# Patient Record
Sex: MALE | Race: WHITE | NOT HISPANIC OR LATINO | Employment: FULL TIME | URBAN - METROPOLITAN AREA
[De-identification: names, ages, dates, MRNs, and addresses within clinical notes are randomized per-mention and may not be internally consistent; named-entity substitution may affect disease eponyms.]

---

## 2017-06-04 ENCOUNTER — HOSPITAL ENCOUNTER (EMERGENCY)
Facility: HOSPITAL | Age: 27
Discharge: HOME | End: 2017-06-04
Attending: EMERGENCY MEDICINE | Admitting: EMERGENCY MEDICINE

## 2017-06-04 VITALS
BODY MASS INDEX: 39.28 KG/M2 | SYSTOLIC BLOOD PRESSURE: 128 MMHG | HEART RATE: 84 BPM | OXYGEN SATURATION: 98 % | HEIGHT: 72 IN | TEMPERATURE: 97.9 F | DIASTOLIC BLOOD PRESSURE: 69 MMHG | RESPIRATION RATE: 16 BRPM | WEIGHT: 290 LBS

## 2017-06-04 DIAGNOSIS — T26.12XA: Primary | ICD-10-CM

## 2017-06-04 PROCEDURE — 99282 EMERGENCY DEPT VISIT SF MDM: CPT

## 2017-06-04 RX ORDER — DICLOFENAC SODIUM 75 MG/1
75 TABLET, DELAYED RELEASE ORAL 2 TIMES DAILY
Qty: 14 TABLET | Refills: 0 | Status: SHIPPED | OUTPATIENT
Start: 2017-06-04 | End: 2018-10-07

## 2017-06-04 RX ORDER — TRAMADOL HYDROCHLORIDE 50 MG/1
50 TABLET ORAL EVERY 6 HOURS PRN
Qty: 15 TABLET | Refills: 0 | Status: SHIPPED | OUTPATIENT
Start: 2017-06-04 | End: 2018-10-07

## 2017-06-04 RX ORDER — ERYTHROMYCIN 5 MG/G
OINTMENT OPHTHALMIC
Qty: 3.5 G | Refills: 0 | Status: SHIPPED | OUTPATIENT
Start: 2017-06-04 | End: 2017-06-11

## 2017-06-05 NOTE — ED PROVIDER NOTES
Subjective   HPI Comments: Coleman Nolasco is a 27 y.o.male who presents to the ED with c/o eye pain.  While the pt was cooking at work today, cooking oil splashed into his left eye.  He immediately washed the eye with water, but he developed progressively worsening pain and intermittent blurriness in the eye.  Due to worsening of sx the pt presents to the ED where his eye appears red and he states that he has a HA, secondary to pain, but denies any other severe burns or acute sx.    Patient is a 27 y.o. male presenting with eye problem.   History provided by:  Patient  Eye Problem   Location:  Left eye  Onset quality:  Sudden  Duration: incident occurred today.  Timing:  Constant  Progression:  Worsening  Chronicity:  New  Context: burn    Relieved by:  Nothing  Worsened by:  Nothing  Ineffective treatments:  Flushing  Associated symptoms: blurred vision, headaches and redness    Associated symptoms: no photophobia        Review of Systems   Eyes: Positive for blurred vision, pain, redness and visual disturbance. Negative for photophobia.   Neurological: Positive for headaches.   All other systems reviewed and are negative.      History reviewed. No pertinent past medical history.    No Known Allergies    History reviewed. No pertinent surgical history.    History reviewed. No pertinent family history.    Social History     Social History   • Marital status: Single     Spouse name: N/A   • Number of children: N/A   • Years of education: N/A     Social History Main Topics   • Smoking status: Never Smoker   • Smokeless tobacco: None   • Alcohol use Yes      Comment: occasional   • Drug use: Yes     Special: Marijuana   • Sexual activity: Not Asked     Other Topics Concern   • None     Social History Narrative   • None         Objective   Physical Exam   Constitutional: He is oriented to person, place, and time. He appears well-developed and well-nourished.   HENT:   Head: Normocephalic and atraumatic.   Eyes:  Conjunctivae and EOM are normal. Pupils are equal, round, and reactive to light. Right eye exhibits no discharge. Left eye exhibits no discharge. No scleral icterus.   Cornea clear.  Lids everted.  Conjunctiva pink, normal.  No swelling.  No periorbital swelling.   Neck: Neck supple.   Cardiovascular: Normal rate.    Pulmonary/Chest: Effort normal.   Musculoskeletal: Normal range of motion.   Neurological: He is alert and oriented to person, place, and time.   Skin: Skin is warm and dry.   Psychiatric: He has a normal mood and affect. His behavior is normal. Thought content normal.   Nursing note and vitals reviewed.      Procedures         ED Course  ED Course     Minor thermal burn to R cornea.  Pt counseled on findings, plan and followup.                MDM    Final diagnoses:   Thermal burn of cornea, left, initial encounter       Documentation assistance provided by nieves Collier.  Information recorded by the nieves was done at my direction and has been verified and validated by me.     Shaw Collier  06/04/17 2148       Magdy Gore MD  06/05/17 0008

## 2018-10-06 ENCOUNTER — HOSPITAL ENCOUNTER (EMERGENCY)
Facility: HOSPITAL | Age: 28
Discharge: HOME | End: 2018-10-07
Attending: EMERGENCY MEDICINE | Admitting: EMERGENCY MEDICINE

## 2018-10-06 DIAGNOSIS — T25.222A BURN, FOOT, SECOND DEGREE, LEFT, INITIAL ENCOUNTER: ICD-10-CM
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
## 2018-10-06 DIAGNOSIS — T24.209A SUPERFICIAL PARTIAL THICKNESS BURN OF LOWER EXTREMITY: Primary | ICD-10-CM

## 2018-10-06 PROCEDURE — 99283 EMERGENCY DEPT VISIT LOW MDM: CPT

## 2018-10-07 VITALS
HEIGHT: 72 IN | SYSTOLIC BLOOD PRESSURE: 137 MMHG | BODY MASS INDEX: 37.25 KG/M2 | HEART RATE: 82 BPM | TEMPERATURE: 98.2 F | OXYGEN SATURATION: 98 % | WEIGHT: 275 LBS | RESPIRATION RATE: 18 BRPM | DIASTOLIC BLOOD PRESSURE: 87 MMHG

## 2018-10-07 RX ORDER — NAPROXEN 500 MG/1
500 TABLET ORAL ONCE
Status: COMPLETED | OUTPATIENT
Start: 2018-10-07 | End: 2018-10-07

## 2018-10-07 RX ORDER — NAPROXEN 500 MG/1
500 TABLET ORAL 2 TIMES DAILY WITH MEALS
Qty: 14 TABLET | Refills: 0 | Status: SHIPPED | OUTPATIENT
Start: 2018-10-07 | End: 2019-04-18

## 2018-10-07 RX ADMIN — SILVER SULFADIAZINE 1 APPLICATION: 10 CREAM TOPICAL at 00:26

## 2018-10-07 RX ADMIN — NAPROXEN 500 MG: 500 TABLET ORAL at 00:29

## 2018-10-07 NOTE — ED PROVIDER NOTES
Subjective   Mr. Coleman Nolasco is a 28 y.o. male who presents to the ED with c/o a left foot burn. He reports that around 2130 today he was at work when he splashed hot water on his foot. He has since developed blisters to his left foot, which have opened. He states that he attempted to apply burn cream right after the event but it caused even more pain so he stopped. No other acute complaints at this time. Tetanus UTD per patient.         History provided by:  Patient  Lower Extremity Issue   Location:  Foot  Time since incident:  3 hours  Injury: yes    Mechanism of injury comment:  Burn  Foot location:  L foot  Pain details:     Severity:  Moderate    Onset quality:  Sudden    Duration:  3 hours    Timing:  Constant  Chronicity:  New  Dislocation: no    Foreign body present:  No foreign bodies      Review of Systems   Skin:        Burn blister to left foot       History reviewed. No pertinent past medical history.    No Known Allergies    History reviewed. No pertinent surgical history.    History reviewed. No pertinent family history.    Social History     Social History   • Marital status: Single     Social History Main Topics   • Smoking status: Never Smoker   • Alcohol use Yes      Comment: occasional   • Drug use: Yes     Types: Marijuana      Comment: occassional     Other Topics Concern   • Not on file         Objective   Physical Exam   Constitutional: He is oriented to person, place, and time. He appears well-developed and well-nourished. No distress.   Cardiovascular: Normal rate, regular rhythm, normal heart sounds and intact distal pulses.    No murmur heard.  Pulmonary/Chest: Effort normal and breath sounds normal. No respiratory distress.   Musculoskeletal: Normal range of motion.        Neurological: He is alert and oriented to person, place, and time.   Skin: Skin is warm and dry. He is not diaphoretic.   Patient has a 5cm partial thickness 2nd degree burn to the lateral aspect of his left  "foot.    Psychiatric: He has a normal mood and affect. His behavior is normal.   Nursing note and vitals reviewed.      Procedures         ED Course       No results found for this or any previous visit (from the past 24 hour(s)).  Note: In addition to lab results from this visit, the labs listed above may include labs taken at another facility or during a different encounter within the last 24 hours. Please correlate lab times with ED admission and discharge times for further clarification of the services performed during this visit.    No orders to display     Vitals:    10/06/18 2213 10/07/18 0043   BP: 135/82 137/87   BP Location: Right arm    Patient Position: Sitting    Pulse: 104 82   Resp: 22 18   Temp: 98.2 °F (36.8 °C)    TempSrc: Oral    SpO2: 98% 98%   Weight: 125 kg (275 lb)    Height: 182.9 cm (72\")      Medications   naproxen (NAPROSYN) tablet 500 mg (500 mg Oral Given 10/7/18 0029)   silver sulfadiazine (SILVADENE, SSD) 1 % cream 1 application (1 application Topical Given 10/7/18 0026)     ECG/EMG Results (last 24 hours)     ** No results found for the last 24 hours. **                      MDM  Number of Diagnoses or Management Options  Burn, foot, second degree, left, initial encounter:   Superficial partial thickness burn of lower extremity:   Work related injury:       Final diagnoses:   Superficial partial thickness burn of lower extremity   Burn, foot, second degree, left, initial encounter   Work related injury       Documentation assistance provided by nieves Renee.  Information recorded by the nieves was done at my direction and has been verified and validated by me.     Carlota Renee  10/07/18 0007       Juan Villanueva MD  10/07/18 0120    "

## 2019-04-18 ENCOUNTER — APPOINTMENT (OUTPATIENT)
Dept: GENERAL RADIOLOGY | Facility: HOSPITAL | Age: 29
End: 2019-04-18

## 2019-04-18 ENCOUNTER — HOSPITAL ENCOUNTER (EMERGENCY)
Facility: HOSPITAL | Age: 29
Discharge: HOME | End: 2019-04-18
Attending: EMERGENCY MEDICINE | Admitting: EMERGENCY MEDICINE

## 2019-04-18 VITALS
SYSTOLIC BLOOD PRESSURE: 138 MMHG | WEIGHT: 280 LBS | HEART RATE: 79 BPM | DIASTOLIC BLOOD PRESSURE: 75 MMHG | BODY MASS INDEX: 37.93 KG/M2 | RESPIRATION RATE: 18 BRPM | HEIGHT: 72 IN | OXYGEN SATURATION: 98 % | TEMPERATURE: 98.5 F

## 2019-04-18 DIAGNOSIS — S62.339A CLOSED BOXER'S FRACTURE, INITIAL ENCOUNTER: Primary | ICD-10-CM

## 2019-04-18 PROCEDURE — 73130 X-RAY EXAM OF HAND: CPT

## 2019-04-18 PROCEDURE — 99283 EMERGENCY DEPT VISIT LOW MDM: CPT

## 2020-01-17 ENCOUNTER — HOSPITAL ENCOUNTER (EMERGENCY)
Facility: HOSPITAL | Age: 30
Discharge: HOME | End: 2020-01-17
Attending: EMERGENCY MEDICINE | Admitting: EMERGENCY MEDICINE

## 2020-01-17 VITALS
WEIGHT: 280 LBS | HEIGHT: 72 IN | DIASTOLIC BLOOD PRESSURE: 100 MMHG | BODY MASS INDEX: 37.93 KG/M2 | TEMPERATURE: 98.6 F | RESPIRATION RATE: 18 BRPM | HEART RATE: 60 BPM | OXYGEN SATURATION: 98 % | SYSTOLIC BLOOD PRESSURE: 161 MMHG

## 2020-01-17 DIAGNOSIS — S61.209A AVULSION OF SKIN OF FINGER, INITIAL ENCOUNTER: Primary | ICD-10-CM

## 2020-01-17 PROCEDURE — 99283 EMERGENCY DEPT VISIT LOW MDM: CPT

## 2020-01-17 NOTE — ED PROVIDER NOTES
Subjective   Pt is a 30 yo male presenting to ED with complaints of left thumb laceration. He was cutting cilantro at work at [employer] with a knife and accidentally sliced tip of left thumb. Flap of skin came off and no nail involved. Bleeding controlled with pressure bandage. No gapping wound. He denies pain with movement. He denies concern for foreign bodies. His Tdap is UTD.       History provided by:  Patient  Laceration   Location:  Finger  Finger laceration location:  L thumb  Length:  1  Depth:  Cutaneous  Quality: avulsion    Bleeding: controlled    Time since incident:  1 hour  Laceration mechanism:  Knife  Foreign body present:  No foreign bodies  Relieved by:  Pressure  Worsened by:  Nothing  Tetanus status:  Up to date  Associated symptoms: no focal weakness, no numbness and no swelling        Review of Systems   Skin: Positive for wound (Left thumb laceration).   Neurological: Negative for focal weakness, weakness and numbness.   All other systems reviewed and are negative.      No past medical history on file.    No Known Allergies    No past surgical history on file.    No family history on file.    Social History     Socioeconomic History   • Marital status: Single     Spouse name: Not on file   • Number of children: Not on file   • Years of education: Not on file   • Highest education level: Not on file   Tobacco Use   • Smoking status: Never Smoker   Substance and Sexual Activity   • Alcohol use: Yes     Comment: occasional   • Drug use: Yes     Types: Marijuana     Comment: occassional   • Sexual activity: Defer           Objective   Physical Exam   Constitutional: He appears well-developed and well-nourished.   HENT:   Head: Atraumatic.   Eyes: Conjunctivae are normal.   Neck: Normal range of motion.   Cardiovascular: Normal rate and intact distal pulses.   Pulmonary/Chest: Effort normal. No respiratory distress.   Musculoskeletal:        Left hand: He exhibits laceration. He exhibits  "normal range of motion, no tenderness, normal capillary refill, no deformity and no swelling. Normal sensation noted. Normal strength noted.        Hands:  Neurological: He is alert.   Skin: Skin is warm.   Psychiatric: He has a normal mood and affect.   Nursing note and vitals reviewed.      Procedures           ED Course      Wound cleaned with saline and hibiclens. Dressed with non stick dressing.     Discussed healing process from inside out. No need for sutures. Discussed hygenic practices to keep wound clean.     No results found for this or any previous visit (from the past 24 hour(s)).  Note: In addition to lab results from this visit, the labs listed above may include labs taken at another facility or during a different encounter within the last 24 hours. Please correlate lab times with ED admission and discharge times for further clarification of the services performed during this visit.    No orders to display     Vitals:    01/17/20 1056 01/17/20 1107   BP: 161/100    Pulse: 60    Resp: 18    Temp: 98.6 °F (37 °C)    SpO2: 98%    Weight:  127 kg (280 lb)   Height:  182.9 cm (72\")     Medications - No data to display  ECG/EMG Results (last 24 hours)     ** No results found for the last 24 hours. **        No orders to display                       Asim Coma Scale Score: 15                          MDM    Final diagnoses:   Avulsion of skin of finger, initial encounter            Maryellen Blood PA  01/17/20 1147    "

## 2021-08-03 ENCOUNTER — OFFICE VISIT (OUTPATIENT)
Dept: INTERNAL MEDICINE | Facility: CLINIC | Age: 31
End: 2021-08-03

## 2021-08-03 ENCOUNTER — LAB (OUTPATIENT)
Dept: LAB | Facility: HOSPITAL | Age: 31
End: 2021-08-03

## 2021-08-03 VITALS
OXYGEN SATURATION: 96 % | HEIGHT: 71 IN | HEART RATE: 92 BPM | BODY MASS INDEX: 44.1 KG/M2 | DIASTOLIC BLOOD PRESSURE: 106 MMHG | WEIGHT: 315 LBS | SYSTOLIC BLOOD PRESSURE: 134 MMHG

## 2021-08-03 DIAGNOSIS — L30.4 INTERTRIGO: ICD-10-CM

## 2021-08-03 DIAGNOSIS — H01.136 EYELID DERMATITIS, ECZEMATOUS, LEFT: ICD-10-CM

## 2021-08-03 DIAGNOSIS — Z11.3 ROUTINE SCREENING FOR STI (SEXUALLY TRANSMITTED INFECTION): ICD-10-CM

## 2021-08-03 DIAGNOSIS — Z76.89 ENCOUNTER TO ESTABLISH CARE: ICD-10-CM

## 2021-08-03 DIAGNOSIS — Z76.89 ENCOUNTER TO ESTABLISH CARE: Primary | ICD-10-CM

## 2021-08-03 LAB
BASOPHILS # BLD AUTO: 0.06 10*3/MM3 (ref 0–0.2)
BASOPHILS NFR BLD AUTO: 0.7 % (ref 0–1.5)
DEPRECATED RDW RBC AUTO: 38.8 FL (ref 37–54)
EOSINOPHIL # BLD AUTO: 0.65 10*3/MM3 (ref 0–0.4)
EOSINOPHIL NFR BLD AUTO: 7.5 % (ref 0.3–6.2)
ERYTHROCYTE [DISTWIDTH] IN BLOOD BY AUTOMATED COUNT: 12.3 % (ref 12.3–15.4)
HCT VFR BLD AUTO: 45.1 % (ref 37.5–51)
HGB BLD-MCNC: 15.7 G/DL (ref 13–17.7)
IMM GRANULOCYTES # BLD AUTO: 0.07 10*3/MM3 (ref 0–0.05)
IMM GRANULOCYTES NFR BLD AUTO: 0.8 % (ref 0–0.5)
LYMPHOCYTES # BLD AUTO: 1.87 10*3/MM3 (ref 0.7–3.1)
LYMPHOCYTES NFR BLD AUTO: 21.5 % (ref 19.6–45.3)
MCH RBC QN AUTO: 30.4 PG (ref 26.6–33)
MCHC RBC AUTO-ENTMCNC: 34.8 G/DL (ref 31.5–35.7)
MCV RBC AUTO: 87.4 FL (ref 79–97)
MONOCYTES # BLD AUTO: 0.78 10*3/MM3 (ref 0.1–0.9)
MONOCYTES NFR BLD AUTO: 9 % (ref 5–12)
NEUTROPHILS NFR BLD AUTO: 5.27 10*3/MM3 (ref 1.7–7)
NEUTROPHILS NFR BLD AUTO: 60.5 % (ref 42.7–76)
NRBC BLD AUTO-RTO: 0 /100 WBC (ref 0–0.2)
PLATELET # BLD AUTO: 328 10*3/MM3 (ref 140–450)
PMV BLD AUTO: 9.9 FL (ref 6–12)
RBC # BLD AUTO: 5.16 10*6/MM3 (ref 4.14–5.8)
WBC # BLD AUTO: 8.7 10*3/MM3 (ref 3.4–10.8)

## 2021-08-03 PROCEDURE — 87661 TRICHOMONAS VAGINALIS AMPLIF: CPT

## 2021-08-03 PROCEDURE — 83036 HEMOGLOBIN GLYCOSYLATED A1C: CPT

## 2021-08-03 PROCEDURE — 85025 COMPLETE CBC W/AUTO DIFF WBC: CPT

## 2021-08-03 PROCEDURE — 87522 HEPATITIS C REVRS TRNSCRPJ: CPT

## 2021-08-03 PROCEDURE — 86695 HERPES SIMPLEX TYPE 1 TEST: CPT

## 2021-08-03 PROCEDURE — 80061 LIPID PANEL: CPT

## 2021-08-03 PROCEDURE — 80053 COMPREHEN METABOLIC PANEL: CPT

## 2021-08-03 PROCEDURE — G0432 EIA HIV-1/HIV-2 SCREEN: HCPCS

## 2021-08-03 PROCEDURE — 84443 ASSAY THYROID STIM HORMONE: CPT

## 2021-08-03 PROCEDURE — 99204 OFFICE O/P NEW MOD 45 MIN: CPT

## 2021-08-03 PROCEDURE — 36415 COLL VENOUS BLD VENIPUNCTURE: CPT

## 2021-08-03 PROCEDURE — 86592 SYPHILIS TEST NON-TREP QUAL: CPT

## 2021-08-03 PROCEDURE — 86696 HERPES SIMPLEX TYPE 2 TEST: CPT

## 2021-08-03 PROCEDURE — 87591 N.GONORRHOEAE DNA AMP PROB: CPT

## 2021-08-03 PROCEDURE — 87491 CHLMYD TRACH DNA AMP PROBE: CPT

## 2021-08-03 RX ORDER — TRIAMCINOLONE ACETONIDE 0.25 MG/G
CREAM TOPICAL 2 TIMES DAILY
Qty: 30 G | Refills: 0 | Status: SHIPPED | OUTPATIENT
Start: 2021-08-03 | End: 2021-08-17

## 2021-08-03 NOTE — PROGRESS NOTES
Chief Complaint  Establish Care (Pt states rash/redness over left eye and on penis) and Rash    Gunnerer Fei Nolasco presents to [facility]    The patient is here to establish care and management of acute chronic conditions. No previous PCP    New interval complaints:    2 days of left periorbital area that is irritated and has dry flaky skin. 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 Was not wearing safety glasses at the time but is now. No eye redness, pain, or discharge reported. Has not tried anything. Visual acuity is intact. Wears contacts.     Groin irritation: Flaking skin, redness, and irritation around shaft of penis and in the groin region. Reports getting sweaty when at work. Not circumcised. Irritation has made it difficult for him to retract his foreskin for urination. No discharge out of penis. No burning with urination. Reports having bouts of unprotected sex. No fevers or chills reported. Has not been tested for any STI's.       Subjective         Health Maintenance   Topic Date Due   • ANNUAL PHYSICAL  Never done   • HEPATITIS C SCREENING  Never done   • TDAP/TD VACCINES (1 - Tdap) 08/03/2021 (Originally 2/28/2011)   • INFLUENZA VACCINE  10/01/2021   • COVID-19 Vaccine  Completed   • Pneumococcal Vaccine 0-64  Aged Out     PMSFH  The following portions of the patient's history were reviewed and updated as appropriate: allergies, current medications, past family history, past medical history, past social history, past surgical history and problem list.     History reviewed. No pertinent past medical history.   No Known Allergies   Social History     Tobacco Use   • Smoking status: Never Smoker   Substance Use Topics   • Alcohol use: Yes     Comment: occasional   • Drug use: Yes     Types: Marijuana     Comment: occassional     History reviewed. No  "pertinent surgical history.   Family History   Problem Relation Age of Onset   • Seizures Mother    • Cancer Father    • Hypertension Father          Current Outpatient Medications:   •  miconazole (Micatin) 2 % cream, Apply  topically to the appropriate area as directed 2 (Two) Times a Day. Apply 4g to the affected area two times per day., Disp: 28 g, Rfl: 3  •  triamcinolone (KENALOG) 0.025 % cream, Apply  topically to the appropriate area as directed 2 (Two) Times a Day for 14 days., Disp: 30 g, Rfl: 0    Review of Systems   Eyes: Negative for blurred vision, double vision, photophobia, pain, discharge, redness, itching and visual disturbance.   Respiratory: Negative for apnea, cough, choking, chest tightness, shortness of breath, wheezing and stridor.    Cardiovascular: Negative for chest pain, palpitations and leg swelling.   Skin: Positive for color change, dry skin and rash. Negative for pallor, skin lesions and bruise.       Objective   Vital Signs  BP (!) 134/106   Pulse 92   Ht 180.3 cm (71\")   Wt (!) 146 kg (322 lb)   SpO2 96%   BMI 44.91 kg/m²     Physical Exam  Constitutional:       Appearance: Normal appearance. He is obese.   HENT:      Head: Normocephalic and atraumatic.      Right Ear: External ear normal.      Left Ear: External ear normal.      Nose: Nose normal.      Mouth/Throat:      Mouth: Mucous membranes are moist.   Eyes:      Extraocular Movements: Extraocular movements intact.      Conjunctiva/sclera: Conjunctivae normal.      Pupils: Pupils are equal, round, and reactive to light.   Cardiovascular:      Rate and Rhythm: Normal rate and regular rhythm.      Pulses: Normal pulses.      Heart sounds: Normal heart sounds.   Pulmonary:      Effort: Pulmonary effort is normal.      Breath sounds: Normal breath sounds.   Abdominal:      General: Bowel sounds are normal.      Palpations: Abdomen is soft.   Musculoskeletal:         General: Normal range of motion.      Cervical back: Normal " range of motion and neck supple.   Skin:     General: Skin is warm.      Capillary Refill: Capillary refill takes less than 2 seconds.      Coloration: Skin is not ashen, cyanotic, jaundiced, mottled, pale or sallow.      Findings: Erythema and rash present. Rash is not crusting, nodular, purpuric, pustular, scaling or vesicular.             Comments: Intertrigo located in bilateral groin and scrotal area.    Neurological:      General: No focal deficit present.      Mental Status: He is alert and oriented to person, place, and time.   Psychiatric:         Mood and Affect: Mood normal.         Behavior: Behavior normal.          Result Review :        Assessment and Plan    1. Encounter to establish care  - Hepatitis C RNA, quantitative, PCR (graph); Future  - CBC w AUTO Differential; Future  - Comprehensive metabolic panel; Future  - Lipid panel; Future  - TSH Rfx On Abnormal To Free T4; Future  -Hgb A1C  -Will obtain baseline lab work to screen for underlying conditions and treat as appropriate.   2. Routine screening for STI (sexually transmitted infection)  - Chlamydia trachomatis, Neisseria gonorrhoeae, Trichomonas vaginalis, PCR - Urine, Urine, Clean Catch; Future  - HIV-1/O/2 ANTIGEN/ANTIBODY, 4TH GENERATION; Future  - HSV 1 and 2-Specific Ab, IgG; Future  - RPR; Future  - Will screen for STIs and treat as appropriate.   3. Eyelid dermatitis, eczematous, left  - triamcinolone (KENALOG) 0.025 % cream; Apply  topically to the appropriate area as directed 2 (Two) Times a Day for 14 days.  Dispense: 30 g; Refill: 0  - Initiate low-potency steroid for eyelid dermatitis for two week maximum. Apply as prescribed.  -Encouraged to avoid irritants and allergens  -Use bland, fragrance-free emollients, such as petrolatum, may be applied directly to the eyelids.   - Instructed to call into the office immediately or seek emergency care if the periorbital area became red, swollen, painful, or had discharge.    4.  Intertrigo  - miconazole (Micatin) 2 % cream; Apply  topically to the appropriate area as directed 2 (Two) Times a Day. Apply 4g to the affected area two times per day.  Dispense: 28 g; Refill: 3  - Apply miconazole to affected area BID.  - Encouraged to cleanse the area daily and ensure that it remains dry  - Encouraged to wear only cotton undergarments.   5. Body mass index (BMI) of 40.0 to 44.9 in adult (CMS/formerly [facility])  - Will obtain labs and assess for underlying conditions r/t morbid obesity  -Continue (insert medication). Initiate (insert medication). Monitor blood pressure at home and compile a blood pressure diary to bring to the follow-up visit. -Encouraged to participate in 150 mins per week (30 mins per day 5 days per week) of moderate intensity aerobic exercise or 75 mins per week of vigorous aerobic activity.  - Encouraged to consume a healthy diet such as the Dash diet which is rich in fruits, vegetables, whole grains, poultry, fish, nuts, and low-fat dairy products with reduced content of saturated fat (13 g maximum), sugar sweetened beverages, red meats, and total fat.  -Encouraged patient to set realistic goals. A safe weight loss goal is losing 1-2 lbs per week.           Follow Up     Return in about 4 weeks (around 8/31/2021) for Annual.    Patient was given instructions and counseling regarding his condition or for health maintenance advice. Please see specific information pulled into the AVS if appropriate.    Part of this note may be an electronic transcription/translation of spoken language to printed text using the Dragon Dictation System.    Electronically signed by:   MIKE Daniel  08/03/2021

## 2021-08-04 LAB
ALBUMIN SERPL-MCNC: 4.5 G/DL (ref 3.5–5.2)
ALBUMIN/GLOB SERPL: 1.5 G/DL
ALP SERPL-CCNC: 61 U/L (ref 39–117)
ALT SERPL W P-5'-P-CCNC: 40 U/L (ref 1–41)
ANION GAP SERPL CALCULATED.3IONS-SCNC: 9.3 MMOL/L (ref 5–15)
AST SERPL-CCNC: 22 U/L (ref 1–40)
BILIRUB SERPL-MCNC: 0.4 MG/DL (ref 0–1.2)
BUN SERPL-MCNC: 8 MG/DL (ref 6–20)
BUN/CREAT SERPL: 8.5 (ref 7–25)
CALCIUM SPEC-SCNC: 9.6 MG/DL (ref 8.6–10.5)
CHLORIDE SERPL-SCNC: 105 MMOL/L (ref 98–107)
CHOLEST SERPL-MCNC: 152 MG/DL (ref 0–200)
CO2 SERPL-SCNC: 26.7 MMOL/L (ref 22–29)
CREAT SERPL-MCNC: 0.94 MG/DL (ref 0.76–1.27)
GFR SERPL CREATININE-BSD FRML MDRD: 95 ML/MIN/1.73
GLOBULIN UR ELPH-MCNC: 3.1 GM/DL
GLUCOSE SERPL-MCNC: 90 MG/DL (ref 65–99)
HBA1C MFR BLD: 4.9 % (ref 4.8–5.6)
HCV RNA SERPL NAA+PROBE-ACNC: NORMAL IU/ML
HDLC SERPL-MCNC: 45 MG/DL (ref 40–60)
HIV1+2 AB SER QL: NORMAL
HSV1 IGG SER IA-ACNC: 36.8 INDEX (ref 0–0.9)
HSV2 IGG SER IA-ACNC: <0.91 INDEX (ref 0–0.9)
LDLC SERPL CALC-MCNC: 81 MG/DL (ref 0–100)
LDLC/HDLC SERPL: 1.7 {RATIO}
POTASSIUM SERPL-SCNC: 4.1 MMOL/L (ref 3.5–5.2)
PROT SERPL-MCNC: 7.6 G/DL (ref 6–8.5)
RPR SER QL: NORMAL
SODIUM SERPL-SCNC: 141 MMOL/L (ref 136–145)
TEST INFORMATION: NORMAL
TRIGL SERPL-MCNC: 152 MG/DL (ref 0–150)
TSH SERPL DL<=0.05 MIU/L-ACNC: 0.83 UIU/ML (ref 0.27–4.2)
VLDLC SERPL-MCNC: 26 MG/DL (ref 5–40)

## 2021-08-05 LAB
C TRACH RRNA SPEC QL NAA+PROBE: NEGATIVE
N GONORRHOEA RRNA SPEC QL NAA+PROBE: NEGATIVE
T VAGINALIS DNA SPEC QL NAA+PROBE: NEGATIVE

## 2021-09-15 ENCOUNTER — OFFICE VISIT (OUTPATIENT)
Dept: INTERNAL MEDICINE | Facility: CLINIC | Age: 31
End: 2021-09-15

## 2021-09-15 VITALS
RESPIRATION RATE: 18 BRPM | DIASTOLIC BLOOD PRESSURE: 102 MMHG | HEART RATE: 78 BPM | HEIGHT: 71 IN | WEIGHT: 315 LBS | OXYGEN SATURATION: 99 % | BODY MASS INDEX: 44.1 KG/M2 | TEMPERATURE: 97.9 F | SYSTOLIC BLOOD PRESSURE: 148 MMHG

## 2021-09-15 DIAGNOSIS — H01.131 ECZEMATOUS DERMATITIS OF UPPER EYELIDS OF BOTH EYES: Primary | ICD-10-CM

## 2021-09-15 DIAGNOSIS — E66.01 MORBID (SEVERE) OBESITY DUE TO EXCESS CALORIES (HCC): ICD-10-CM

## 2021-09-15 DIAGNOSIS — I10 ESSENTIAL HYPERTENSION: ICD-10-CM

## 2021-09-15 DIAGNOSIS — N47.8 FORESKIN DOES NOT RETRACT: ICD-10-CM

## 2021-09-15 DIAGNOSIS — Z23 NEED FOR TD VACCINE: ICD-10-CM

## 2021-09-15 DIAGNOSIS — H01.134 ECZEMATOUS DERMATITIS OF UPPER EYELIDS OF BOTH EYES: Primary | ICD-10-CM

## 2021-09-15 PROCEDURE — 99214 OFFICE O/P EST MOD 30 MIN: CPT

## 2021-09-15 PROCEDURE — 99395 PREV VISIT EST AGE 18-39: CPT

## 2021-09-15 PROCEDURE — 90714 TD VACC NO PRESV 7 YRS+ IM: CPT

## 2021-09-15 PROCEDURE — 90471 IMMUNIZATION ADMIN: CPT

## 2021-09-15 RX ORDER — LISINOPRIL 10 MG/1
10 TABLET ORAL DAILY
Qty: 30 TABLET | Refills: 0 | Status: SHIPPED | OUTPATIENT
Start: 2021-09-15 | End: 2021-10-22 | Stop reason: SDUPTHER

## 2021-09-15 RX ORDER — TRIAMCINOLONE ACETONIDE 0.25 MG/G
1 CREAM TOPICAL 2 TIMES DAILY
Qty: 30 G | Refills: 0 | Status: SHIPPED | OUTPATIENT
Start: 2021-09-15 | End: 2021-09-29

## 2021-09-15 NOTE — PROGRESS NOTES
Immunization  Immunization performed in [facility] by Francia Pratt MA. Patient tolerated the procedure well without complications.  09/15/21   Francia Pratt MA

## 2021-09-15 NOTE — PROGRESS NOTES
"Chief Complaint  Annual Exam and Hypertension    Coleman Nolasco presents to [facility]ICINE    Balanitis: Was treated for balanitis and intertrigo after our last visit with miconazole and hydrocortisone. Inflammation, burning, and itching has improved but patient still cannot retract the foreskin. Reports that he had an injury where he had a tear in his foreskin in 2014 and has not been able to retract the foreskin since that injury healed. No issues with urination. Pain is a 0 out of 10.     Eyelid Eczema: Was treated for eyelid eczema which had improved but has since returned. 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 States that he did not use emollients daily after it improved.     Morbid Obesity- Was started on Semaglutide by a provider at his work place 2 weeks ago. Reports having intermittent diarrhea. Has began walking daily and following a calorie restricted diet. Has lost 2 lbs so far.     HTN- Had elevated blood pressure reading at the last visit. Has not been taking blood pressures at home. Not monitoring Na in the diet. Currently asymptomatic.     The patient is being seen for a health maintenance evaluation.  The last health maintenance was >5 year(s) ago.    Social History  Coleman  does not smoke cigarettes.   He drinks no alcohol.  He does use illicit drugs. Some occasional marijuana use.     General History  Coleman  does not have regular dental visits.   He does complain of vision problems. Wears eye glasses. Last eye exam was February 2021.   Immunizations are not up to date. The patient needs the following immunizations: Tdap and influenza.     Lifestyle  Coleman  consumes a in general, an \"unhealthy\" diet.  He exercises daily.    Reproductive Health  Coleman  is sexually active. His contraceptive plan is condoms.   He does not have erectile " dysfunction.     Screening  Last PSA was N/A.  Last prostate exam was  N/A. Family history of prostate cancer: No  Last testicular exam was self performed monthly.   Last colonoscopy was N/A. Family history of colon cancer: No.       Health Maintenance   Wearing seatbelt: yes  Smoke detectors in home: yes    Patient denies fever, chills, headache, ear pain, sore throat, shortness of air, cough, wheezing, chest pain, palpitations, abdominal pain, nausea, vomiting, diarrhea, dysuria, blood in stool or urine. Mood is stable. Eating and drinking as usual. No unexplained weight loss or gain.       Subjective       Health Maintenance   Topic   • INFLUENZA VACCINE    • ANNUAL PHYSICAL    • TDAP/TD VACCINES (2 - Tdap)   • HEPATITIS C SCREENING    • COVID-19 Vaccine    • Pneumococcal Vaccine 0-64        PMSFH  The following portions of the patient's history were reviewed and updated as appropriate: allergies, current medications, past family history, past medical history, past social history, past surgical history and problem list.     History reviewed. No pertinent past medical history.   No Known Allergies   Social History     Tobacco Use   • Smoking status: Never Smoker   • Smokeless tobacco: Former User     Types: Snuff   Vaping Use   • Vaping Use: Never used   Substance Use Topics   • Alcohol use: Yes     Comment: occasional   • Drug use: Yes     Types: Marijuana     Comment: occassional     History reviewed. No pertinent surgical history.   Family History   Problem Relation Age of Onset   • Seizures Mother    • Cancer Father    • Hypertension Father          Current Outpatient Medications:   •  miconazole (Micatin) 2 % cream, Apply  topically to the appropriate area as directed 2 (Two) Times a Day. Apply 4g to the affected area two times per day., Disp: 28 g, Rfl: 3  •  Semaglutide,0.25 or 0.5MG/DOS, (OZEMPIC) 2 MG/1.5ML solution pen-injector, Inject 0.25 mg under the skin into the appropriate area as directed 1 (One)  "Time Per Week., Disp: , Rfl:   •  lisinopril (PRINIVIL,ZESTRIL) 10 MG tablet, Take 1 tablet by mouth Daily., Disp: 30 tablet, Rfl: 0  •  triamcinolone (KENALOG) 0.025 % cream, Apply 1 application topically to the appropriate area as directed 2 (Two) Times a Day for 14 days., Disp: 30 g, Rfl: 0    Review of Systems   Constitutional: Negative for appetite change, chills, diaphoresis, fatigue, fever and unexpected weight loss.   HENT: Negative for nosebleeds, rhinorrhea, sinus pressure, sore throat, swollen glands, trouble swallowing and voice change.    Eyes: Negative for blurred vision, double vision, photophobia and visual disturbance.   Respiratory: Negative for apnea, cough, chest tightness, shortness of breath and wheezing.    Cardiovascular: Negative for chest pain, palpitations and leg swelling.   Gastrointestinal: Negative for abdominal pain, blood in stool, constipation, diarrhea, nausea, vomiting and GERD.   Genitourinary: Negative for decreased urine volume, difficulty urinating, dysuria, flank pain, scrotal swelling, testicular pain, urgency and urinary incontinence.   Musculoskeletal: Negative for arthralgias and myalgias.   Skin: Negative for color change, rash and skin lesions.   Neurological: Negative for dizziness, syncope, facial asymmetry, speech difficulty, weakness, light-headedness and headache.   Psychiatric/Behavioral: Negative for behavioral problems, dysphoric mood, hallucinations, self-injury, sleep disturbance, suicidal ideas and depressed mood. The patient is not nervous/anxious.        Objective   Vital Signs  BP (!) 148/102   Pulse 78   Temp 97.9 °F (36.6 °C)   Resp 18   Ht 180.3 cm (71\")   Wt (!) 145 kg (320 lb 6.4 oz)   SpO2 99%   BMI 44.69 kg/m²     Physical Exam  Constitutional:       General: He is not in acute distress.     Appearance: Normal appearance. He is obese. He is not ill-appearing or toxic-appearing.   HENT:      Head: Normocephalic.      Right Ear: Hearing, " tympanic membrane, ear canal and external ear normal.      Left Ear: Hearing, tympanic membrane, ear canal and external ear normal.      Nose: Nose normal. No congestion or rhinorrhea.      Mouth/Throat:      Mouth: Mucous membranes are moist.      Pharynx: Oropharynx is clear. No oropharyngeal exudate or posterior oropharyngeal erythema.   Eyes:      General: Lids are normal. No allergic shiner, visual field deficit or scleral icterus.     Extraocular Movements: Extraocular movements intact.      Right eye: No nystagmus.      Left eye: No nystagmus.      Conjunctiva/sclera: Conjunctivae normal.      Pupils: Pupils are equal, round, and reactive to light.   Neck:      Thyroid: No thyroid mass, thyromegaly or thyroid tenderness.      Vascular: No carotid bruit.      Trachea: Trachea and phonation normal.   Cardiovascular:      Rate and Rhythm: Normal rate and regular rhythm.      Chest Wall: PMI is not displaced. No thrill.      Pulses: Normal pulses.           Radial pulses are 2+ on the right side and 2+ on the left side.        Dorsalis pedis pulses are 2+ on the right side and 2+ on the left side.        Posterior tibial pulses are 2+ on the right side and 2+ on the left side.      Heart sounds: No murmur heard.   No gallop. No S3 sounds.    Pulmonary:      Effort: Pulmonary effort is normal.      Breath sounds: Normal breath sounds.   Abdominal:      General: Abdomen is flat. Bowel sounds are normal.      Palpations: Abdomen is soft.      Tenderness: There is no abdominal tenderness. There is no guarding or rebound.      Hernia: No hernia is present.   Genitourinary:     Pubic Area: No rash or pubic lice.       Penis: Uncircumcised. Phimosis present. No paraphimosis, hypospadias, erythema, tenderness, discharge, swelling or lesions.       Testes: Normal. Cremasteric reflex is present.   Musculoskeletal:         General: Normal range of motion.      Cervical back: Normal range of motion and neck supple. No  rigidity.      Right lower leg: No edema.      Left lower leg: No edema.   Lymphadenopathy:      Head:      Right side of head: No submental, submandibular, tonsillar, preauricular, posterior auricular or occipital adenopathy.      Left side of head: No submental, submandibular, tonsillar, preauricular, posterior auricular or occipital adenopathy.      Cervical: No cervical adenopathy.      Upper Body:      Right upper body: No supraclavicular, axillary, pectoral or epitrochlear adenopathy.      Left upper body: No supraclavicular, axillary, pectoral or epitrochlear adenopathy.   Skin:     General: Skin is warm and dry.      Capillary Refill: Capillary refill takes less than 2 seconds.      Findings: No abrasion.      Nails: There is no clubbing.   Neurological:      General: No focal deficit present.      Mental Status: He is alert and oriented to person, place, and time.      GCS: GCS eye subscore is 4. GCS verbal subscore is 5. GCS motor subscore is 6.      Cranial Nerves: Cranial nerves are intact.      Motor: Motor function is intact.      Coordination: Coordination is intact.      Deep Tendon Reflexes: Reflexes are normal and symmetric.   Psychiatric:         Attention and Perception: Attention and perception normal.         Mood and Affect: Mood and affect normal.         Speech: Speech normal.         Behavior: Behavior normal. Behavior is cooperative.         Thought Content: Thought content normal.         Cognition and Memory: Cognition and memory normal.         Judgment: Judgment normal.          Result Review :     The following data was reviewed by: MIKE Daniel on 09/15/2021:  CMP    CMP 8/3/21   Glucose 90   BUN 8   Creatinine 0.94   eGFR Non African Am 95   Sodium 141   Potassium 4.1   Chloride 105   Calcium 9.6   Albumin 4.50   Total Bilirubin 0.4   Alkaline Phosphatase 61   AST (SGOT) 22   ALT (SGPT) 40           CBC w/diff    CBC w/Diff 8/3/21   WBC 8.70   RBC 5.16   Hemoglobin 15.7    Hematocrit 45.1   MCV 87.4   MCH 30.4   MCHC 34.8   RDW 12.3   Platelets 328   Neutrophil Rel % 60.5   Immature Granulocyte Rel % 0.8 (A)   Lymphocyte Rel % 21.5   Monocyte Rel % 9.0   Eosinophil Rel % 7.5 (A)   Basophil Rel % 0.7   (A) Abnormal value            Lipid Panel    Lipid Panel 8/3/21   Total Cholesterol 152   Triglycerides 152 (A)   HDL Cholesterol 45   VLDL Cholesterol 26   LDL Cholesterol  81   LDL/HDL Ratio 1.70   (A) Abnormal value              Assessment and Plan    1. Eczematous dermatitis of upper eyelids of both eyes  - triamcinolone (KENALOG) 0.025 % cream; Apply 1 application topically to the appropriate area as directed 2 (Two) Times a Day for 14 days.  Dispense: 30 g; Refill: 0  - Initiate low-potency steroid for eyelid dermatitis for two week maximum. Apply as prescribed.  -Encouraged to avoid irritants and allergens  -Use bland, fragrance-free emollients, such as petrolatum, may be applied directly to the eyelids.   - Instructed to call into the office immediately or seek emergency care if the periorbital area became red, swollen, painful, or had discharge.    - Will refer to Dermatology if symptoms return again.   2. Foreskin does not retract  - Ambulatory Referral to Urology    3. Morbid (severe) obesity due to excess calories (CMS/HCC)  - Encouraged to continue following with new provider managing weight loss.  - Counseled a safe weight loss goal in 1-2 lbs per week.   - Encouraged patient to follow exercise and diet recommendations discussed during visit.   - Follow-up if no improvement in weight loss past 3 months with new provider on Ozempic. Will consider orlistat.     4. Need for Td vaccine  - Td Vaccine Greater Than or Equal To 8yo Preservative Free IM    5. Annual physical Exam   - Nutrition and activity goals reviewed including: mainly water to drink, limit white flour/processed sugar; increase high protein, high fiber carbs, good breakfast, working toward 150 mins cardio per  week, resistance training 2x/week.    The patient is here for a health maintenance visit.  Screening lab work is ordered.  Immunizations are reported as current other than influenza. Denies influenza vaccine today.  Advice and education is given regarding nutrition, aerobic exercise, routine dental evaluations, routine eye exams, reproductive health, cardiovascular risk reduction.  Further recommendations after lab evaluation.  Annual wellness evaluations recommended.     I discussed the patients findings and my recommendations with patient.  Patient was encouraged to keep me informed of any acute changes or any new concerning symptoms.  Patient voiced understanding of all instructions and denied further questions.    6. Essential Hypertension  - Lisinopril 10 mg once per day.  - Uncontrolled. Initiate lisinopril 10 mg once per day.   - Encouraged to monitor blood pressure at home and compile a blood pressure diary to bring to the follow-up visit. Obtain 2 readings per day with 1 upon first awakening and 1 in the evening.   - Goal is blood pressure less than 130/80.   - Encouraged to attempt to achieve an ideal BMI of 18.5-24.9 and expect about 0.5-2 mm Hg reduction in blood pressure for every 1 kg reduction in body weight.   - Encouraged to limit sodium intake to a maximum of 2400 mg/day and if possible 1500 mg/day for the best benefit in blood pressure reduction.   - Avoid using NSAIDs, decongestants, stimulants, or any illicit drugs (amphetamines and cocaine).   - Encouraged to please call into the office or seek emergency care if systolic blood pressure sustaining above 180 and/or diastolic over 120 and/or experiencing symptoms such as headache, dizziness, altered mental status, shortness of breath, chest pain, decreased urine output, vomiting, or changes in vision.           Follow Up     Return in about 1 month (around 10/15/2021), or HTN follow-up.    Patient was given instructions and counseling regarding  his condition or for health maintenance advice. Please see specific information pulled into the AVS if appropriate.    Part of this note may be an electronic transcription/translation of spoken language to printed text using the Dragon Dictation System.    Electronically signed by:   MIKE Daniel  09/15/2021

## 2021-10-14 ENCOUNTER — OFFICE VISIT (OUTPATIENT)
Dept: UROLOGY | Facility: CLINIC | Age: 31
End: 2021-10-14

## 2021-10-14 VITALS
SYSTOLIC BLOOD PRESSURE: 120 MMHG | WEIGHT: 315 LBS | HEIGHT: 72 IN | HEART RATE: 79 BPM | DIASTOLIC BLOOD PRESSURE: 80 MMHG | BODY MASS INDEX: 42.66 KG/M2 | OXYGEN SATURATION: 97 %

## 2021-10-14 DIAGNOSIS — N47.1 PHIMOSIS: Primary | ICD-10-CM

## 2021-10-14 PROCEDURE — 99203 OFFICE O/P NEW LOW 30 MIN: CPT | Performed by: UROLOGY

## 2021-10-14 RX ORDER — CLOTRIMAZOLE AND BETAMETHASONE DIPROPIONATE 10; .64 MG/G; MG/G
1 CREAM TOPICAL 2 TIMES DAILY
Qty: 45 G | Refills: 0 | Status: SHIPPED | OUTPATIENT
Start: 2021-10-14 | End: 2021-10-22

## 2021-10-14 NOTE — PROGRESS NOTES
Office Visit New Urology      Patient Name: Coleman Nolasco  : 1990   MRN: [id number]     Chief Complaint: Pain and irritation with foreskin    Referring Provider: Tereso Shaw APRN    History of Present Illness: Coleman Nolasco is a 31 y.o. male who presents to Urology today for valuation of pain and irritation associated with foreskin.  The patient is uncircumcised.  He reports a significant trauma/tearing of the foreskin during intercourse in .  He reports since that time he has had further difficulty retracting the foreskin and simply is unable to retract foreskin.  He denies associated bleeding or cracking.  He denies lower urinary tract symptoms including dysuria, frequency, urgency, incontinence.  He denies prior history of urinary tract infection or STI.  He denies pain or irritation associated with erection or ejaculation.      Subjective      Review of System: Review of Systems   Constitutional: Negative for chills, fatigue, fever and unexpected weight change.   HENT: Negative for sore throat.    Eyes: Negative for visual disturbance.   Respiratory: Negative for cough, chest tightness and shortness of breath.    Cardiovascular: Negative for chest pain and leg swelling.   Gastrointestinal: Negative for blood in stool, constipation, diarrhea, nausea, rectal pain and vomiting.   Genitourinary: Positive for penile pain. Negative for decreased urine volume, difficulty urinating, dysuria, enuresis, flank pain, frequency, genital sores, hematuria and urgency.   Musculoskeletal: Negative for back pain and joint swelling.   Skin: Negative for rash and wound.   Neurological: Negative for seizures, speech difficulty, weakness and headaches.   Psychiatric/Behavioral: Negative for confusion, sleep disturbance and suicidal ideas. The patient is not nervous/anxious.       I have reviewed the ROS documented by my clinical staff, updated appropriately and I agree. Giovany Hernandez,  "MD    Past Medical History: History reviewed. No pertinent past medical history.    Past Surgical History: History reviewed. No pertinent surgical history.    Family History:   Family History   Problem Relation Age of Onset   • Seizures Mother    • Cancer Father    • Hypertension Father        Social History:   Social History     Socioeconomic History   • Marital status: Single   Tobacco Use   • Smoking status: Never Smoker   • Smokeless tobacco: Former User     Types: Snuff   Vaping Use   • Vaping Use: Never used   Substance and Sexual Activity   • Alcohol use: Yes     Comment: occasional   • Drug use: Yes     Types: Marijuana     Comment: occassional   • Sexual activity: Defer       Medications:     Current Outpatient Medications:   •  lisinopril (PRINIVIL,ZESTRIL) 10 MG tablet, Take 1 tablet by mouth Daily., Disp: 30 tablet, Rfl: 0  •  miconazole (Micatin) 2 % cream, Apply  topically to the appropriate area as directed 2 (Two) Times a Day. Apply 4g to the affected area two times per day., Disp: 28 g, Rfl: 3  •  Semaglutide,0.25 or 0.5MG/DOS, (OZEMPIC) 2 MG/1.5ML solution pen-injector, Inject 0.25 mg under the skin into the appropriate area as directed 1 (One) Time Per Week., Disp: , Rfl:   •  clotrimazole-betamethasone (LOTRISONE) 1-0.05 % cream, Apply 1 application topically to the appropriate area as directed 2 (Two) Times a Day. Pea size amount to foreskin twice daily- max of 4 week course, Disp: 45 g, Rfl: 0    Allergies:   No Known Allergies          Objective     Physical Exam:   Vital Signs:   Vitals:    10/14/21 1543   BP: 120/80   Pulse: 79   SpO2: 97%   Weight: (!) 146 kg (322 lb)   Height: 182.9 cm (72\")   PainSc: 0-No pain     Body mass index is 43.67 kg/m².     Physical Exam  Vitals and nursing note reviewed.   Constitutional:       Appearance: Normal appearance. He is normal weight.   Cardiovascular:      Comments: Well-perfused  Pulmonary:      Effort: Pulmonary effort is normal.   Abdominal:      " General: Abdomen is flat.      Palpations: Abdomen is soft.   Neurological:      General: No focal deficit present.      Mental Status: He is alert and oriented to person, place, and time. Mental status is at baseline.   Psychiatric:         Mood and Affect: Mood normal.         Behavior: Behavior normal.         Thought Content: Thought content normal.         Judgment: Judgment normal.         Genitourinary  Penis: uncircumcised penis, phimotic foreskin with inability to retract, cracking or bleeding, ability to evaluate glans no penile discharge.  No rashes/lesions.    Testes: descended bilaterally, no masses, nontender to palpation. Remainder of scrotal contents normal.       Labs:   Brief Urine Lab Results     None               Lab Results   Component Value Date    GLUCOSE 90 08/03/2021    CALCIUM 9.6 08/03/2021     08/03/2021    K 4.1 08/03/2021    CO2 26.7 08/03/2021     08/03/2021    BUN 8 08/03/2021    CREATININE 0.94 08/03/2021    EGFRIFNONA 95 08/03/2021    BCR 8.5 08/03/2021    ANIONGAP 9.3 08/03/2021       Lab Results   Component Value Date    WBC 8.70 08/03/2021    HGB 15.7 08/03/2021    HCT 45.1 08/03/2021    MCV 87.4 08/03/2021     08/03/2021     Have personally viewed the patient's most recent laboratory values including a creatinine of 0.94    Images:   No Images in the past 120 days found..    Measures:   Tobacco:   Coleman Fei Nolasco  reports that he has never smoked. He has quit using smokeless tobacco.  His smokeless tobacco use included snuff.. I have educated him on the risk of diseases from using tobacco products.    Assessment / Plan      Assessment/Plan:   31 y.o. male is here today for evaluation of pain and irritation associated with foreskin.  He reports inability to retract foreskin that is worsening with time.  Denies associated bleeding or cracking he denies lower urinary tract symptoms including frequency, urgency, dysuria, incontinence.    Diagnoses and all  orders for this visit:    1. Phimosis (Primary)  -     clotrimazole-betamethasone (LOTRISONE) 1-0.05 % cream; Apply 1 application topically to the appropriate area as directed 2 (Two) Times a Day. Pea size amount to foreskin twice daily- max of 4 week course  Dispense: 45 g; Refill: 0           Follow Up:   Discussed the etiology and management of phimosis.  We discussed work-up including history and physical exam.  We discussed starting with steroid ream including betamethasone to attempt to loosen scarring of the foreskin.  We also discussed adult circumcision.  I discussed the risks and benefits of this procedure.  The patient would like to attempt conservative strategy at this time.  We will have him complete 4-week course of this topical medication and return in 6 to 8 weeks for further evaluation.  If no improvement we we will further discuss circumcision.  We did discuss the risks and benefits including long-term health benefits of circumcision today he will consider.    I spent 30 minutes providing clinical care for this patient; including review of patient's chart and provider documentation, face to face time spent with patient in examination room (obtaining history, performing physical exam, discussing diagnosis and management options), placing orders, and completing patient documentation.     Giovany Hernandez MD  [facility] [place]

## 2021-10-22 ENCOUNTER — OFFICE VISIT (OUTPATIENT)
Dept: INTERNAL MEDICINE | Facility: CLINIC | Age: 31
End: 2021-10-22

## 2021-10-22 VITALS
BODY MASS INDEX: 42.66 KG/M2 | WEIGHT: 315 LBS | HEART RATE: 96 BPM | HEIGHT: 72 IN | TEMPERATURE: 98.5 F | DIASTOLIC BLOOD PRESSURE: 80 MMHG | SYSTOLIC BLOOD PRESSURE: 118 MMHG | OXYGEN SATURATION: 98 %

## 2021-10-22 DIAGNOSIS — J01.00 ACUTE MAXILLARY SINUSITIS, RECURRENCE NOT SPECIFIED: ICD-10-CM

## 2021-10-22 DIAGNOSIS — I10 ESSENTIAL HYPERTENSION: Primary | ICD-10-CM

## 2021-10-22 DIAGNOSIS — K21.9 GASTROESOPHAGEAL REFLUX DISEASE, UNSPECIFIED WHETHER ESOPHAGITIS PRESENT: ICD-10-CM

## 2021-10-22 PROCEDURE — 99214 OFFICE O/P EST MOD 30 MIN: CPT

## 2021-10-22 RX ORDER — AMOXICILLIN AND CLAVULANATE POTASSIUM 875; 125 MG/1; MG/1
1 TABLET, FILM COATED ORAL 2 TIMES DAILY
Qty: 14 TABLET | Refills: 0 | Status: SHIPPED | OUTPATIENT
Start: 2021-10-22 | End: 2021-10-29

## 2021-10-22 RX ORDER — LISINOPRIL 10 MG/1
10 TABLET ORAL DAILY
Qty: 30 TABLET | Refills: 5 | Status: SHIPPED | OUTPATIENT
Start: 2021-10-22 | End: 2022-05-03 | Stop reason: SDUPTHER

## 2021-10-22 RX ORDER — OMEPRAZOLE 40 MG/1
40 CAPSULE, DELAYED RELEASE ORAL DAILY
Qty: 30 CAPSULE | Refills: 1 | Status: SHIPPED | OUTPATIENT
Start: 2021-10-22

## 2021-10-22 RX ORDER — FLUTICASONE PROPIONATE 50 MCG
2 SPRAY, SUSPENSION (ML) NASAL DAILY
Qty: 16 G | Refills: 2 | Status: SHIPPED | OUTPATIENT
Start: 2021-10-22

## 2021-10-22 NOTE — PROGRESS NOTES
Chief Complaint  Hypertension (Follow up, one month, pt c/o headache and nasal congestion) and Bloated (w/ diarrhea x two days)    Coleman Nolasco presents to [facility]    HTN- Has been taking lisinopril each day with no omitted doses and no side effects reported. Has been monitoring blood pressure at home with systolic's in the 120's and diastolic's in the 70's. Has been restricting salt in the diet. Walking his dog daily. Currently asymptomatic.     GERD- Has noticed an increase in reflux symptoms as of late. Has been taking prevacid bid with no improvement in symptoms. Spicy foods seem to make symptoms worse. No alarm symptoms reported.     Sinusitis: Headache, increased sinus pressure, and nasal congestion x 3 days. Sinus pressure is increased in the front maxillary region bilaterally and is described as achy. Has been taking dayquil each day with mild relief in symptoms. Pain is rated as a 3 out of 10.     Subjective             PMSFH  The following portions of the patient's history were reviewed and updated as appropriate: allergies, current medications, past family history, past medical history, past social history, past surgical history and problem list.     History reviewed. No pertinent past medical history.   No Known Allergies   Social History     Tobacco Use   • Smoking status: Never Smoker   • Smokeless tobacco: Former User     Types: Snuff   Vaping Use   • Vaping Use: Never used   Substance Use Topics   • Alcohol use: Yes     Comment: occasional   • Drug use: Yes     Types: Marijuana     Comment: occassional     History reviewed. No pertinent surgical history.   Family History   Problem Relation Age of Onset   • Seizures Mother    • Cancer Father    • Hypertension Father          Current Outpatient Medications:   •  lisinopril (PRINIVIL,ZESTRIL) 10 MG tablet, Take 1 tablet by mouth Daily., Disp: 30 tablet, Rfl: 5  •  amoxicillin-clavulanate (Augmentin)  "875-125 MG per tablet, Take 1 tablet by mouth 2 (Two) Times a Day for 7 days., Disp: 14 tablet, Rfl: 0  •  fluticasone (Flonase) 50 MCG/ACT nasal spray, 2 sprays into the nostril(s) as directed by provider Daily., Disp: 16 g, Rfl: 2  •  omeprazole (priLOSEC) 40 MG capsule, Take 1 capsule by mouth Daily., Disp: 30 capsule, Rfl: 1    Review of Systems   Constitutional: Negative for activity change, chills, fatigue and fever.   HENT: Positive for congestion and sinus pressure. Negative for dental problem, drooling, ear discharge, ear pain, facial swelling, hearing loss, mouth sores, nosebleeds, postnasal drip, rhinorrhea, sneezing, sore throat, swollen glands, tinnitus, trouble swallowing and voice change.    Respiratory: Negative for apnea, cough, choking, chest tightness, shortness of breath, wheezing and stridor.    Cardiovascular: Negative for chest pain, palpitations and leg swelling.   Gastrointestinal: Positive for GERD. Negative for abdominal distention, abdominal pain, anal bleeding, blood in stool, constipation, diarrhea, nausea, rectal pain, vomiting and indigestion.   Neurological: Positive for headache. Negative for weakness and confusion.       Objective   Vital Signs  /80   Pulse 96   Temp 98.5 °F (36.9 °C)   Ht 182.9 cm (72\")   Wt (!) 146 kg (322 lb)   SpO2 98%   BMI 43.67 kg/m²     Physical Exam  Constitutional:       Appearance: Normal appearance.   HENT:      Nose:      Right Turbinates: Enlarged and swollen.      Left Turbinates: Enlarged and swollen.      Right Sinus: Maxillary sinus tenderness present. No frontal sinus tenderness.      Left Sinus: Maxillary sinus tenderness present. No frontal sinus tenderness.      Mouth/Throat:      Pharynx: Posterior oropharyngeal erythema present. No pharyngeal swelling, oropharyngeal exudate or uvula swelling.   Eyes:      Extraocular Movements: Extraocular movements intact.      Conjunctiva/sclera: Conjunctivae normal.      Pupils: Pupils are " equal, round, and reactive to light.   Cardiovascular:      Rate and Rhythm: Normal rate and regular rhythm.      Pulses: Normal pulses.      Heart sounds: Normal heart sounds.   Pulmonary:      Effort: Pulmonary effort is normal.      Breath sounds: Normal breath sounds.   Abdominal:      General: Bowel sounds are normal.      Palpations: Abdomen is soft.   Skin:     General: Skin is warm and dry.      Capillary Refill: Capillary refill takes less than 2 seconds.   Neurological:      Mental Status: He is alert and oriented to person, place, and time.   Psychiatric:         Mood and Affect: Mood normal.         Behavior: Behavior normal.         Thought Content: Thought content normal.         Judgment: Judgment normal.          Result Review :     The following data was reviewed by: MIKE Daniel on 10/22/2021:  CMP    CMP 8/3/21   Glucose 90   BUN 8   Creatinine 0.94   eGFR Non African Am 95   Sodium 141   Potassium 4.1   Chloride 105   Calcium 9.6   Albumin 4.50   Total Bilirubin 0.4   Alkaline Phosphatase 61   AST (SGOT) 22   ALT (SGPT) 40               Assessment and Plan    1. Essential hypertension  - lisinopril (PRINIVIL,ZESTRIL) 10 MG tablet; Take 1 tablet by mouth Daily.  Dispense: 30 tablet; Refill: 5  - Controlled. Continue with lisinopril as prescribed. Encouraged to continue monitoring blood pressure at home.   - Encouraged to continue limiting sodium to less than 2g/day  - Encouraged to continue exercising.     2. Gastroesophageal reflux disease, unspecified whether esophagitis present  - omeprazole (priLOSEC) 40 MG capsule; Take 1 capsule by mouth Daily.  Dispense: 30 capsule; Refill: 1  - Uncontrolled. Will trial omeprazole for 6 weeks in addition to trigger avoidance. If no improvement in symptoms will refer to GI. Will wean PPI as tolerated.   - Encouraged to avoid trigger foods (coffee, citrus juices, chocolate, alcohol, spicy foods, carbonated beverages, peppermint, and fatty foods) weight  loss to obtain ideal body weight BMI 18.5-24.9, raise the head of the bed by 6 to 8 inches (you can do this by putting blocks of wood or rubber under 2 legs of the bed or a foam wedge under the mattress), avoid late meals (try to plan meals for at least 2 to 3 hours before bedtime), avoid tight clothing, and maintain good sleep hygiene.   - Encouraged to avoid medications such as calcium channel blockers, anticholinergic medications, and theophylline preparations if possible as they reduce lower esophageal sphincter tone.   - Discussed Potential adverse effects with long-term PPI usage include C. difficile, atrophic gastritis, hypergastrinemia, microscopic colitis, magnesium malabsorption, calcium malabsorption, increased fracture risk, osteoporosis, vitamin B12 malabsorption, iron malabsorption, interstitial nephritis and resulting kidney disease, drug-induced lupus, dementia, pneumonia, and increased risk for overall mortality have been discussed with the patient.   - Please call into the office or seek emergency care with any instances of dysphagia (difficulty swallowing), odynophagia (painful swallowing), unintentional weight loss, melena (dark, tarry bowel movements) and/or hematemesis (vomiting blood).     3. Acute maxillary sinusitis, recurrence not specified  - fluticasone (Flonase) 50 MCG/ACT nasal spray; 2 sprays into the nostril(s) as directed by provider Daily.  Dispense: 16 g; Refill: 2  - amoxicillin-clavulanate (Augmentin) 875-125 MG per tablet; Take 1 tablet by mouth 2 (Two) Times a Day for 7 days.  Dispense: 14 tablet; Refill: 0  - Encouraged adequate rest, humidification, and increase clear fluid intake.   - Encouraged nasal saline irrigation with sterile saline nasal spray twice daily for sinus congestion  - Encouraged use warm compresses over sinuses for comfort.   - Alternate use of acetaminophen and Ibuprofen every 4-6 hours as needed for headache      Follow Up     Return in about 6 weeks  (around 12/3/2021).    Patient was given instructions and counseling regarding his condition or for health maintenance advice. Please see specific information pulled into the AVS if appropriate.    Part of this note may be an electronic transcription/translation of spoken language to printed text using the Dragon Dictation System.    Electronically signed by:   MIKE Daniel  10/22/2021

## 2022-04-07 ENCOUNTER — CLINICAL SUPPORT (OUTPATIENT)
Dept: INTERNAL MEDICINE | Facility: CLINIC | Age: 32
End: 2022-04-07

## 2022-04-07 DIAGNOSIS — Z23 NEED FOR COVID-19 VACCINE: Primary | ICD-10-CM

## 2022-04-07 PROCEDURE — 91305 COVID-19 (PFIZER) 12+ YRS: CPT

## 2022-04-07 PROCEDURE — 0051A COVID-19 (PFIZER) 12+ YRS: CPT

## 2022-04-07 NOTE — PROGRESS NOTES
Immunization  Immunization performed in RD by Gabi Simmons MA. Patient tolerated the procedure well without complications.  04/07/22   Gabi Simmons MA     Floor

## 2022-05-03 ENCOUNTER — OFFICE VISIT (OUTPATIENT)
Dept: INTERNAL MEDICINE | Facility: CLINIC | Age: 32
End: 2022-05-03

## 2022-05-03 VITALS
SYSTOLIC BLOOD PRESSURE: 130 MMHG | WEIGHT: 315 LBS | DIASTOLIC BLOOD PRESSURE: 100 MMHG | BODY MASS INDEX: 43.43 KG/M2 | OXYGEN SATURATION: 97 % | TEMPERATURE: 97.1 F | HEART RATE: 77 BPM

## 2022-05-03 DIAGNOSIS — G43.009 MIGRAINE WITHOUT AURA AND WITHOUT STATUS MIGRAINOSUS, NOT INTRACTABLE: Primary | ICD-10-CM

## 2022-05-03 DIAGNOSIS — R06.83 SNORING: ICD-10-CM

## 2022-05-03 DIAGNOSIS — R51.9 INCREASED FREQUENCY OF HEADACHES: ICD-10-CM

## 2022-05-03 DIAGNOSIS — I10 ESSENTIAL HYPERTENSION: ICD-10-CM

## 2022-05-03 PROCEDURE — 99214 OFFICE O/P EST MOD 30 MIN: CPT

## 2022-05-03 RX ORDER — LISINOPRIL 10 MG/1
10 TABLET ORAL DAILY
Qty: 30 TABLET | Refills: 5 | Status: SHIPPED | OUTPATIENT
Start: 2022-05-03

## 2022-05-03 RX ORDER — PROPRANOLOL HYDROCHLORIDE 10 MG/1
10 TABLET ORAL 2 TIMES DAILY
Qty: 180 TABLET | Refills: 1 | Status: SHIPPED | OUTPATIENT
Start: 2022-05-03

## 2022-05-03 NOTE — PROGRESS NOTES
Chief Complaint  Headache (Having Headaches and Migraine with vertigo and nausea/Patient states symptoms have been persistent for a month )    Coleman Nolasco presents to [facility]    HPI: Reports frequent migraines and headaches over the past month. No previous history of migraines. States he has gotten a new glasses prescription but does not feel that this is a factor. Headache begins in his bilateral temporal region and later moves to the right occipital region. Pain is described as aching and throbbing. No changes in vision. No reports of an aura. No triggers noted. Associated with light sensitivity, dizziness and nausea. Usually results in the patient vomiting. Headaches last around 3 hours and are occuring around 3 times per week. Headaches are worse when first sitting up and and is somewhat relieved by laying flat. Taking Excedrin prn which usually provides good relief in symptoms. Pain is rated 9 out of 10 when the headaches occur. Is currently asymptomatic.     HTN- Taking lisinopril each day with no omitted doses and no side effects reported. Has been monitoring BP at home with systolic's in the 120's and diastolic's in the 70's. Walking dog everyday for around 45 mins. Watching salt intake in the diet. Other than headaches is currently asymptomatic.     Subjective         Health Maintenance   Topic   • INFLUENZA VACCINE    • ANNUAL PHYSICAL    • TDAP/TD VACCINES (2 - Tdap)   • HEPATITIS C SCREENING    • COVID-19 Vaccine    • Pneumococcal Vaccine 0-64        PMSFH  The following portions of the patient's history were reviewed and updated as appropriate: allergies, current medications, past family history, past medical history, past social history, past surgical history and problem list.     Past Medical History:   Diagnosis Date   • Migraines       No Known Allergies   Social History     Tobacco Use   • Smoking status: Never Smoker   • Smokeless tobacco: Former  User     Types: Snuff   Vaping Use   • Vaping Use: Never used   Substance Use Topics   • Alcohol use: Yes     Comment: occasional   • Drug use: Yes     Types: Marijuana     Comment: occassional     History reviewed. No pertinent surgical history.   Family History   Problem Relation Age of Onset   • Seizures Mother    • Cancer Father    • Hypertension Father          Current Outpatient Medications:   •  lisinopril (PRINIVIL,ZESTRIL) 10 MG tablet, Take 1 tablet by mouth Daily., Disp: 30 tablet, Rfl: 5  •  omeprazole (priLOSEC) 40 MG capsule, Take 1 capsule by mouth Daily., Disp: 30 capsule, Rfl: 1  •  fluticasone (Flonase) 50 MCG/ACT nasal spray, 2 sprays into the nostril(s) as directed by provider Daily., Disp: 16 g, Rfl: 2  •  propranolol (INDERAL) 10 MG tablet, Take 1 tablet by mouth 2 (Two) Times a Day., Disp: 180 tablet, Rfl: 1    Review of Systems   Constitutional: Negative for appetite change, chills, diaphoresis, fatigue, fever and unexpected weight loss.   HENT: Negative for nosebleeds, rhinorrhea, sinus pressure, sore throat, swollen glands, trouble swallowing and voice change.    Eyes: Negative for blurred vision, double vision, photophobia, pain, discharge, redness, itching and visual disturbance.   Respiratory: Negative for apnea, cough, chest tightness, shortness of breath and wheezing.    Cardiovascular: Negative for chest pain, palpitations and leg swelling.   Gastrointestinal: Positive for nausea. Negative for abdominal pain, blood in stool, constipation, diarrhea, vomiting and GERD.   Genitourinary: Negative for decreased urine volume, difficulty urinating, dysuria and flank pain.   Musculoskeletal: Negative for arthralgias and myalgias.   Skin: Negative for color change, rash and skin lesions.   Neurological: Positive for dizziness and headache. Negative for syncope, facial asymmetry, speech difficulty, weakness and light-headedness.   Psychiatric/Behavioral: Negative for behavioral problems,  dysphoric mood, hallucinations, self-injury, sleep disturbance, suicidal ideas and depressed mood. The patient is not nervous/anxious.        Objective   Vital Signs  /100   Pulse 77   Temp 97.1 °F (36.2 °C)   Wt (!) 145 kg (320 lb 3.2 oz)   SpO2 97%   BMI 43.43 kg/m²     Physical Exam  Constitutional:       General: He is not in acute distress.     Appearance: Normal appearance. He is not ill-appearing or toxic-appearing.      Comments: Morbidly obese    HENT:      Head: Normocephalic.      Right Ear: Hearing, tympanic membrane, ear canal and external ear normal.      Left Ear: Hearing, tympanic membrane, ear canal and external ear normal.      Nose: Nose normal. No congestion or rhinorrhea.      Mouth/Throat:      Mouth: Mucous membranes are moist.      Pharynx: Oropharynx is clear. No oropharyngeal exudate or posterior oropharyngeal erythema.   Eyes:      General: Lids are normal. No allergic shiner, visual field deficit or scleral icterus.     Extraocular Movements: Extraocular movements intact.      Right eye: No nystagmus.      Left eye: No nystagmus.      Conjunctiva/sclera: Conjunctivae normal.      Pupils: Pupils are equal, round, and reactive to light.   Neck:      Thyroid: No thyroid mass, thyromegaly or thyroid tenderness.      Vascular: No carotid bruit.      Trachea: Trachea and phonation normal.   Cardiovascular:      Rate and Rhythm: Normal rate and regular rhythm.      Chest Wall: PMI is not displaced. No thrill.      Pulses: Normal pulses.           Radial pulses are 2+ on the right side and 2+ on the left side.        Dorsalis pedis pulses are 2+ on the right side and 2+ on the left side.        Posterior tibial pulses are 2+ on the right side and 2+ on the left side.      Heart sounds: No murmur heard.    No gallop. No S3 sounds.   Pulmonary:      Effort: Pulmonary effort is normal.      Breath sounds: Normal breath sounds.   Abdominal:      General: Abdomen is protuberant. Bowel  sounds are normal.      Palpations: Abdomen is soft.      Tenderness: There is no abdominal tenderness. There is no guarding or rebound.      Hernia: No hernia is present.   Musculoskeletal:      Cervical back: Normal range of motion and neck supple. No rigidity.      Right lower leg: No edema.      Left lower leg: No edema.   Lymphadenopathy:      Head:      Right side of head: No submental, submandibular, tonsillar, preauricular, posterior auricular or occipital adenopathy.      Left side of head: No submental, submandibular, tonsillar, preauricular, posterior auricular or occipital adenopathy.      Cervical: No cervical adenopathy.      Upper Body:      Right upper body: No epitrochlear adenopathy.      Left upper body: No epitrochlear adenopathy.   Skin:     General: Skin is warm and dry.      Capillary Refill: Capillary refill takes less than 2 seconds.      Findings: No abrasion.      Nails: There is no clubbing.   Neurological:      General: No focal deficit present.      Mental Status: He is alert and oriented to person, place, and time.      GCS: GCS eye subscore is 4. GCS verbal subscore is 5. GCS motor subscore is 6.      Cranial Nerves: Cranial nerves are intact.      Motor: Motor function is intact.      Coordination: Coordination is intact.      Deep Tendon Reflexes: Reflexes are normal and symmetric.   Psychiatric:         Attention and Perception: Attention and perception normal.         Mood and Affect: Mood and affect normal.         Speech: Speech normal.         Behavior: Behavior normal. Behavior is cooperative.         Thought Content: Thought content normal.         Cognition and Memory: Cognition and memory normal.         Judgment: Judgment normal.          Result Review :     The following data was reviewed by: MIKE Daniel on 05/03/2022:  CMP    CMP 8/3/21   Glucose 90   BUN 8   Creatinine 0.94   eGFR Non African Am 95   Sodium 141   Potassium 4.1   Chloride 105   Calcium 9.6    Albumin 4.50   Total Bilirubin 0.4   Alkaline Phosphatase 61   AST (SGOT) 22   ALT (SGPT) 40             Assessment and Plan    1. Migraine without aura and without status migrainosus, not intractable  - propranolol (INDERAL) 10 MG tablet; Take 1 tablet by mouth 2 (Two) Times a Day.  Dispense: 180 tablet; Refill: 1  - No neuro deficits noted on exam. Will place on propanolol for prophylaxis and have patient continue with Excedrin for abortive therapy. Discussed need to monitor BP when starting propanolol. Will obtain neuroimaging with new onset of headaches and changes of positions causing headache to be worse. Discussed following up in office sooner than next scheduled follow-up if work-up is unrevealing and migraines still remain uncontrolled.     2. Essential hypertension  - CBC & Differential; Future  - Comprehensive Metabolic Panel; Future  - TSH Rfx On Abnormal To Free T4; Future  - lisinopril (PRINIVIL,ZESTRIL) 10 MG tablet; Take 1 tablet by mouth Daily.  Dispense: 30 tablet; Refill: 5  - Controlled based on home readings. Patient has not taken his BP medication this morning. Continue with lisinopril as prescribed. Encouraged to monitor BP at home. Discussed goal was less than 130/80. Encouraged patient to increase physical activity and reduce Na intake to 1500 mg/day or less. Discussed following up sooner than the next scheduled follow-up should BP remain above 130/80.     3. Snoring  - Ambulatory Referral to Sleep Medicine    4. Increased frequency of headaches  - MRI Brain With & Without Contrast; Future    Follow Up     Return in about 3 months (around 8/3/2022) for Next scheduled follow up.    Patient was given instructions and counseling regarding his condition or for health maintenance advice. Please see specific information pulled into the AVS if appropriate.    Part of this note may be an electronic transcription/translation of spoken language to printed text using the Dragon Dictation  System.    Electronically signed by:   Tereso Shaw, MIKE  05/03/2022

## 2022-06-06 ENCOUNTER — HOSPITAL ENCOUNTER (OUTPATIENT)
Dept: MRI IMAGING | Facility: HOSPITAL | Age: 32
Discharge: HOME | End: 2022-06-06

## 2022-06-06 DIAGNOSIS — R51.9 INCREASED FREQUENCY OF HEADACHES: ICD-10-CM

## 2022-06-06 PROCEDURE — 0 GADOBENATE DIMEGLUMINE 529 MG/ML SOLUTION

## 2022-06-06 PROCEDURE — A9577 INJ MULTIHANCE: HCPCS

## 2022-06-06 PROCEDURE — 70553 MRI BRAIN STEM W/O & W/DYE: CPT

## 2022-06-06 RX ADMIN — GADOBENATE DIMEGLUMINE 20 ML: 529 INJECTION, SOLUTION INTRAVENOUS at 14:44

## 2025-01-12 ENCOUNTER — APPOINTMENT (OUTPATIENT)
Dept: GENERAL RADIOLOGY | Facility: HOSPITAL | Age: 35
End: 2025-01-12
Payer: COMMERCIAL

## 2025-01-12 ENCOUNTER — HOSPITAL ENCOUNTER (EMERGENCY)
Facility: HOSPITAL | Age: 35
Discharge: HOME | End: 2025-01-12
Attending: EMERGENCY MEDICINE | Admitting: EMERGENCY MEDICINE
Payer: COMMERCIAL

## 2025-01-12 VITALS
WEIGHT: 315 LBS | SYSTOLIC BLOOD PRESSURE: 132 MMHG | OXYGEN SATURATION: 94 % | HEART RATE: 84 BPM | BODY MASS INDEX: 44.1 KG/M2 | TEMPERATURE: 98.3 F | HEIGHT: 71 IN | DIASTOLIC BLOOD PRESSURE: 92 MMHG | RESPIRATION RATE: 18 BRPM

## 2025-01-12 DIAGNOSIS — R07.81 RIB PAIN: ICD-10-CM

## 2025-01-12 DIAGNOSIS — I10 PRIMARY HYPERTENSION: ICD-10-CM

## 2025-01-12 DIAGNOSIS — S16.1XXA ACUTE CERVICAL MYOFASCIAL STRAIN, INITIAL ENCOUNTER: Primary | ICD-10-CM

## 2025-01-12 PROCEDURE — 71101 X-RAY EXAM UNILAT RIBS/CHEST: CPT

## 2025-01-12 PROCEDURE — 99283 EMERGENCY DEPT VISIT LOW MDM: CPT

## 2025-01-12 PROCEDURE — 72040 X-RAY EXAM NECK SPINE 2-3 VW: CPT

## 2025-01-12 RX ORDER — AMLODIPINE BESYLATE 5 MG/1
5 TABLET ORAL
Status: DISCONTINUED | OUTPATIENT
Start: 2025-01-12 | End: 2025-01-12 | Stop reason: HOSPADM

## 2025-01-12 RX ORDER — AMLODIPINE BESYLATE 5 MG/1
5 TABLET ORAL DAILY
Qty: 30 TABLET | Refills: 0 | Status: SHIPPED | OUTPATIENT
Start: 2025-01-12

## 2025-01-12 RX ORDER — CYCLOBENZAPRINE HCL 10 MG
10 TABLET ORAL 3 TIMES DAILY PRN
Qty: 12 TABLET | Refills: 0 | Status: SHIPPED | OUTPATIENT
Start: 2025-01-12

## 2025-01-12 RX ORDER — HYDROCODONE BITARTRATE AND ACETAMINOPHEN 5; 325 MG/1; MG/1
1 TABLET ORAL ONCE
Status: COMPLETED | OUTPATIENT
Start: 2025-01-12 | End: 2025-01-12

## 2025-01-12 RX ADMIN — HYDROCODONE BITARTRATE AND ACETAMINOPHEN 1 TABLET: 5; 325 TABLET ORAL at 16:30

## 2025-01-12 RX ADMIN — AMLODIPINE BESYLATE 5 MG: 5 TABLET ORAL at 16:30
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
## 2025-01-12 NOTE — Clinical Note
[facility] EMERGENCY DEPARTMENT  [address]  [facility] [address]  Phone: [phone]    Coleman Nolasco was seen and treated in our emergency department on 1/12/2025.  He may return to work on 01/15/2025.         Thank you for choosing [facility].    Richard Waldrop PA